# Patient Record
(demographics unavailable — no encounter records)

---

## 2024-10-10 NOTE — HISTORY OF PRESENT ILLNESS
[FreeTextEntry1] : 91yof with dementia and sleep disturbance  worse sleep disturbance when went to other dtr's house some mild increase episodes of agitation when increased tired improved sleep back at home going to day care

## 2024-10-10 NOTE — ASSESSMENT
[FreeTextEntry1] : Dementia with worse sleep disturbance: cw  trazodone 25mg qhs for sleep disturbance did not tolerate aricept- and no effect discussed trial of namenda, c/w assistance with IADL's and some ADL's

## 2024-11-13 NOTE — CONSULT LETTER
[Dear  ___] : Dear  [unfilled], [Consult Letter:] : I had the pleasure of evaluating your patient, [unfilled]. [Please see my note below.] : Please see my note below. [Consult Closing:] : Thank you very much for allowing me to participate in the care of this patient.  If you have any questions, please do not hesitate to contact me. [Sincerely,] : Sincerely, [FreeTextEntry3] : Dr. Eric Lopez

## 2024-11-13 NOTE — DISCUSSION/SUMMARY
[de-identified] : Discussed at length the nature of the patient's condition. Patient is minimally symptomatic to her bilateral knees which is secondary to degenerative arthritis with varus deformity. We will continue a course of nonoperative treatment. In the interim I suggested she continue with home exercise and senior center activities and weight management.  Tylenol if needed. She uses a cane for ambulation. They can continue activities as tolerated. This was explained in the presence of their daughter.   I will see them back in 6 months with x-rays of both knees.

## 2024-11-13 NOTE — ADDENDUM
[FreeTextEntry1] : This note was written by Keke De Leon on 11/13/2024 acting as scribe for Dr. Eric Lopez M.D.  I, Dr. Eric Lopez, have read and attest that all the information, medical decision making and discharge instructions within are true and accurate.

## 2024-11-13 NOTE — PHYSICAL EXAM
[de-identified] : General appearance: well-nourished and hydrated, pleasant, alert and oriented x 3, cooperative. HEENT: Normocephalic, EOM intact, Nasal septum midline, Oral cavity clear, External auditory canal clear. Cardiovascular: no apparent abnormalities, no lower leg edema, no varicosities, pedal pulses are palpable. Lymphatics Lymph nodes: none palpated, Lymphedema: not present. Neurologic: sensation is normal, no muscle weakness in upper or lower extremities, patella tendon reflexes intact. Dermatologic no apparent skin lesions, moist, warm, no rash. Spine: cervical spine appears normal and moves freely, thoracic spine appears normal and moves freely, lumbosacral spine appears normal and moves freely. Gait: nonantalgic.  Right Knee Inspection: trace effusion, no erythema.  Wounds: none. Alignment: 5 degrees varus alignment. Palpation: no specific tenderness on palpation. ROM: Active (in degrees):  0-100 with crepitus and minimal discomfort on arc of motion Ligamentous laxity (neg): all ligaments appear stable, negative ant. drawer test, negative post. drawer test, stable to varus stress test, stable to valgus stress test, negative Lachman's test, negative pivot shift test, Meniscal Test: negative McMurrays, negative Janet. Patellofemoral Alignment Test: Q angle-, normal. Muscle Test: good quad strength. Leg examination: calf is soft and non-tender.  Left Knee Inspection: trace effusion, no erythema.  Wounds: none. Alignment: 5 degrees varus alignment . Palpation: no specific tenderness on palpation. ROM: Active (in degrees): 0-100 with crepitus and minimal discomfort on arc of motion Ligamentous laxity (neg): all ligaments appear stable, negative ant. drawer test, negative post. drawer test, stable to varus stress test, stable to valgus stress test, negative Lachman's test, negative pivot shift test, Meniscal Test: negative McMurrays, negative Janet. Patellofemoral Alignment Test: Q angle-, normal. Muscle Test: good quad strength. Leg examination: calf is soft and non-tender. [de-identified] : Left knee x-rays, standing AP/Lateral and Merchant films, and 45-degree PA standing view, taken at the office today shows diffuse tricompartmental degenerative arthritis, medial joint space narrowing, marginal osteophytes, sclerosis, patellofemoral joint space narrowing, peripheral osteophytes, Kellgren and Teddy grade 4.  Right knee x-rays, standing AP/Lateral and Merchant films, and 45-degree PA standing view, taken at the office today shows diffuse tricompartmental degenerative arthritis, medial joint space narrowing, marginal osteophytes, sclerosis, patellofemoral joint space narrowing, peripheral osteophytes, Kellgren and Teddy grade 4.

## 2024-11-13 NOTE — HISTORY OF PRESENT ILLNESS
[de-identified] : KELLY MCCLELLAND, 91 year old female, presents for bilateral osteoarthritis. Patient has no symptoms today. She is doing well. She states her knees don't bother her at all. She uses a walker for ambulation. She does home exercises with her daughter. She denies pain medication use.

## 2025-01-17 NOTE — PHYSICAL EXAM
[Alert] : alert [Sclera] : the sclera and conjunctiva were normal [EOMI] : extraocular movements were intact [Normal Outer Ear/Nose] : the ears and nose were normal in appearance [Normal Appearance] : the appearance of the neck was normal [Supple] : the neck was supple [No Respiratory Distress] : no respiratory distress [No Acc Muscle Use] : no accessory muscle use [Respiration, Rhythm And Depth] : normal respiratory rhythm and effort [Auscultation Breath Sounds / Voice Sounds] : lungs were clear to auscultation bilaterally [Normal S1, S2] : normal S1 and S2 [Heart Rate And Rhythm] : heart rate was normal and rhythm regular [Edema] : edema was not present [Involuntary Movements] : no involuntary movements were seen [Normal Color / Pigmentation] : normal skin color and pigmentation [No Focal Deficits] : no focal deficits [Normal Affect] : the affect was normal [Normal Mood] : the mood was normal [de-identified] : epdermoid cyst on lower side of left breast, non inflammed

## 2025-01-17 NOTE — REVIEW OF SYSTEMS
[Fever] : no fever [Chills] : no chills [As Noted in HPI] : as noted in HPI [Negative] : Heme/Lymph [FreeTextEntry3] : macular degeneration

## 2025-01-17 NOTE — HISTORY OF PRESENT ILLNESS
[FreeTextEntry1] : 91yoF with HTN, afib, memory loss, gout seen for follow up with her dtr  Dementia: worse memory loss and has sleep disturbance did not tolerate Aricept-  eating okay no swallowing problems goes to day program twice weekly, going well needing for direction and assistance tolieting self No incontinence  functional status: walks with walker at home no recent falls  no swelling of legs no sob BM's are stable  htn: home readings have been controlled.  gout: no recurrent flares  afib: denies any bleeding episodes

## 2025-01-17 NOTE — ASSESSMENT
[FreeTextEntry1] : Dementia with sleep disturbance: cw  trazodone 50mg qhs for sleep disturbance did not tolerate aricept- and no effect cw namenda at 5mg bid (hold off on increase due to daytime tiredness) c/w assistance with IADL's and some ADL's c/w  program 2 days a week  ACP: MOLST on file: DNR/DNI  htn: stable c/w home log c/w current medications  hld; stable c/w crestor  unsteady gait: c/w walker use fall precautions  afib: stable c/w xaretlo.

## 2025-01-23 NOTE — HISTORY OF PRESENT ILLNESS
[FreeTextEntry1] : 91 year old woman s/p AVR, chronic AF.  She has been free of chest discomfort or dyspnea. or syncope.  BP elevated.  Patient has dementia.

## 2025-01-23 NOTE — DISCUSSION/SUMMARY
[FreeTextEntry1] : S/P AVR.  Stable terms of symptoms and exam.  AF continue metoprolol and Xarelto. HTN increase valsartan to 160.  Follow up echo scheduled. [EKG obtained to assist in diagnosis and management of assessed problem(s)] : EKG obtained to assist in diagnosis and management of assessed problem(s)

## 2025-01-23 NOTE — PHYSICAL EXAM

## 2025-02-14 NOTE — HISTORY OF PRESENT ILLNESS
[FreeTextEntry1] : rpa spots [de-identified] : Pt is a 91 year old F presenting for f/u with daughter Yulia  # Hard white spot on the right hand, present for a month. Picked at it this morning. Not painful nor bleeding.   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

## 2025-02-14 NOTE — PHYSICAL EXAM
[Alert] : alert [Well Nourished] : well nourished [Conjunctiva Non-injected] : conjunctiva non-injected [No Visual Lymphadenopathy] : no visual  lymphadenopathy [No Clubbing] : no clubbing [No Edema] : no edema [No Bromhidrosis] : no bromhidrosis [No Chromhidrosis] : no chromhidrosis [FreeTextEntry3] : white to pink scaly papule on the right dorsal hand  gritty erythematous papules on dorsal hands

## 2025-02-14 NOTE — HISTORY OF PRESENT ILLNESS
[FreeTextEntry1] : rpa spots [de-identified] : Pt is a 91 year old F presenting for f/u with daughter Yulia  # Hard white spot on the right hand, present for a month. Picked at it this morning. Not painful nor bleeding.   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

## 2025-02-14 NOTE — ASSESSMENT
[FreeTextEntry1] : #Actinic keratosis, dorsal hands  - Treated 6 lesions w/ LN2 X 2 cycles - The risks/benefits/alternatives of cryo-destruction was explained to the patient which include but are not limited to redness, pain, blistering, scar, discoloration of skin, and recurrence. The patient expressed understanding of these risks and agreed to the procedure. The procedure was well tolerated, without complication. We have discussed related skin care.  - in one week, Efudex 5% cream over dorsal hands BID for 3-6 weeks. SED including burning and inflammation. Medication ordered this visit.  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 SCCis vs. evolving SCC - clinical photo taken today for monitoring The risks/benefits/alternatives of cryo-destruction was explained to the patient which, include but are not limited to redness, swelling, pain, blistering, scar, discoloration of skin, and recurrence. The patient expressed understanding of these risks and agreed to the procedure. # 1 lesions treated with 2 cycles of LN2. The procedure was well tolerated, without complication. We have discussed related skin care. - Efudex trial as above - biopsy next visit if still present   RTC 3 months

## 2025-02-14 NOTE — HISTORY OF PRESENT ILLNESS
[FreeTextEntry1] : rpa spots [de-identified] : Pt is a 91 year old F presenting for f/u with daughter Yulia  # Hard white spot on the right hand, present for a month. Picked at it this morning. Not painful nor bleeding.   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

## 2025-02-27 NOTE — HISTORY OF PRESENT ILLNESS
[FreeTextEntry1] : 91yoF with pmhx of atrial fibrillation on Xarelto, HTN, HLD, s/p AVR, PPM, CAD, gout, unsteady gait walks with walker, Dementia who seen for acute visit with her daughters for elevated BP.  history of provided by irma due to dementia:  elevated BP found yesterday during echocardiogram, and again today in 180- 190 sbp adherent with medications. - dtr provides also report new eye movement abnormalities. patient has dementia, and has confusion at baseline.   Her gait is slow at baseline, she was slightly more imbalanced this morning, but dtr reports walked at her baseline into office today denies severe headache, fall/trauma.   MOLST form: DNR/DNI

## 2025-02-27 NOTE — REVIEW OF SYSTEMS
[Fever] : no fever [Chills] : no chills [Eyesight Problems] : eyesight problems [Incontinence] : incontinence [As Noted in HPI] : as noted in HPI [Negative] : Endocrine

## 2025-02-27 NOTE — PHYSICAL EXAM
[Alert] : alert [Sclera] : the sclera and conjunctiva were normal [Normal Outer Ear/Nose] : the ears and nose were normal in appearance [Normal Appearance] : the appearance of the neck was normal [No Respiratory Distress] : no respiratory distress [No Acc Muscle Use] : no accessory muscle use [Respiration, Rhythm And Depth] : normal respiratory rhythm and effort [Involuntary Movements] : no involuntary movements were seen [Normal Affect] : the affect was normal [Normal Mood] : the mood was normal [FreeTextEntry1] : abnormal EOMI, CN 6 palsy [de-identified] : no other gross motor deficits, gait unsteady (at baseline)

## 2025-02-27 NOTE — ASSESSMENT
[FreeTextEntry1] : 91yoF with pmhx of atrial fibrillation on Xarelto, HTN, HLD, s/p AVR, PPM, CAD, gout, unsteady gait walks with walker, Dementia.  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

## 2025-03-03 NOTE — PHYSICAL EXAM
[Alert] : alert [No Acute Distress] : in no acute distress [Sclera] : the sclera and conjunctiva were normal [Normal Outer Ear/Nose] : the ears and nose were normal in appearance [Normal Appearance] : the appearance of the neck was normal [No Respiratory Distress] : no respiratory distress [No Acc Muscle Use] : no accessory muscle use [Auscultation Breath Sounds / Voice Sounds] : lungs were clear to auscultation bilaterally [Normal S1, S2] : normal S1 and S2 [Heart Rate And Rhythm] : heart rate was normal and rhythm regular [Edema] : edema was not present [Normal Affect] : the affect was normal [Normal Mood] : the mood was normal [FreeTextEntry1] : abnormal EOMI [de-identified] : right sided facial weakness mild, diplopia and dysmetria, normal heel to shin b/l

## 2025-03-03 NOTE — ASSESSMENT
[FreeTextEntry1] : thyroid nodule: previous US from 2021 approx 3.0 cm right nodule CT neck measuring 2.5cm plan to repeat US of thyroid  CVA: hospital records reviewed CTH and CT neck reviewed diplopia and mild right sided facial droop pending f/u with neurologist next week also needs f/u with ophthalmologist c/w statin and xarelto  afib: stable c/w xarelto  htn: flucuating advised to start home BP check daily  c/w metoprolol 100mg and valsaran 160mg daily  unsteady gait: discussed risk of falls walker use fall prevention  DNR/DNI

## 2025-04-28 NOTE — PHYSICAL EXAM
[Alert] : alert [No Acute Distress] : in no acute distress [Sclera] : the sclera and conjunctiva were normal [EOMI] : extraocular movements were intact [Normal Outer Ear/Nose] : the ears and nose were normal in appearance [Normal Appearance] : the appearance of the neck was normal [Normal] : heart rate was normal and rhythm regular, normal S1 and S2 [Edema] : edema was not present [Abdomen Tenderness] : non-tender [Normal Affect] : the affect was normal [Normal Mood] : the mood was normal [de-identified] : slow unsteady gait, walker use [de-identified] : sebacous cyst [de-identified] : shuffling gait, bradykinesia

## 2025-04-28 NOTE — HISTORY OF PRESENT ILLNESS
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 using walker parkinsonism features  htn: has been elevated so now on higher dose of valsartan, but still uncontrolled  sleeping very well-  open to dose reduction of trazadone  no swallowing issues eating well sleeping well no urinary or bowel complaints

## 2025-04-28 NOTE — ASSESSMENT
[FreeTextEntry1] : HTN: uncontrolled increase valsartan to 320mg daily c/w metoprolol at current dose  sleep disturbance: reduce trazadone from 50mg to 25mg qhs  dementia: worse anxiety- separation, repetitive c/w namenda 5mg daily-  consider dose elevation vs addition anxiety medication once adjust trazadone  afib: stable c/w xarelto   unsteady gait: discussed risk of falls walker use fall prevention

## 2025-05-25 NOTE — HISTORY OF PRESENT ILLNESS
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

## 2025-05-25 NOTE — PHYSICAL EXAM
[Alert] : alert [Well Nourished] : well nourished [Conjunctiva Non-injected] : conjunctiva non-injected [No Visual Lymphadenopathy] : no visual  lymphadenopathy [No Clubbing] : no clubbing [No Edema] : no edema [No Bromhidrosis] : no bromhidrosis [No Chromhidrosis] : no chromhidrosis [FreeTextEntry3] : surgical scar on r dorsal hand

## 2025-05-25 NOTE — ASSESSMENT
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

## 2025-06-02 NOTE — REVIEW OF SYSTEMS
[Feeling Tired] : feeling tired [Confused] : confusion [Fever] : no fever [Chills] : no chills [Heart Rate Is Slow] : the heart rate was not slow [Heart Rate Is Fast] : the heart rate was not fast [Chest Pain] : no chest pain [Palpitations] : no palpitations [Shortness Of Breath] : no shortness of breath [Wheezing] : no wheezing [Cough] : no cough [Abdominal Pain] : no abdominal pain [Vomiting] : no vomiting [Constipation] : no constipation [Diarrhea] : no diarrhea [Dysuria] : no dysuria [Dizziness] : no dizziness [FreeTextEntry2] : limited due to underlying dementia

## 2025-06-02 NOTE — PHYSICAL EXAM
[Alert] : alert [No Acute Distress] : in no acute distress [EOMI] : extraocular movements were intact [Normal Oral Mucosa] : normal oral mucosa [Normal Appearance] : the appearance of the neck was normal [Supple] : the neck was supple [No Respiratory Distress] : no respiratory distress [No Acc Muscle Use] : no accessory muscle use [Auscultation Breath Sounds / Voice Sounds] : lungs were clear to auscultation bilaterally [Normal S1, S2] : normal S1 and S2 [Heart Rate And Rhythm] : heart rate was normal and rhythm regular [Abdomen Tenderness] : non-tender [Abdomen Soft] : soft [Normal Color / Pigmentation] : normal skin color and pigmentation [No Focal Deficits] : no focal deficits [Normal Insight/Judgment] : insight and judgment were not intact

## 2025-06-02 NOTE — HISTORY OF PRESENT ILLNESS
[Medical reason not done] : Medical reason not done [I have developed a follow-up plan documented below in the note.] : I have developed a follow-up plan documented below in the note. [FreeTextEntry1] : 93 yo female w/ a hx of a fib on Xarelto, htn, hld, s/p AVR, PPM, CAD, gout and dementia presents to the office for an acute visit. Pt is accompanied by her daughter who helps provide history. Pt states she has no acute complaints. Daughter reports patient was confused over the last week or so. She reported throat pain a few days ago. Currently denies any throat discomfort. Denies fever, chills, congestion, cough, n/v/d. Patient lives with another daughter who was not present at the visit today who reports a foul odor from her urine. Pt also wants to go to bed earlier in the day and seems more tired than usual.  [de-identified] : dementia

## 2025-06-18 NOTE — ADDENDUM
[FreeTextEntry1] : This note was written by Velia Mejia on 06/18/2025 acting as scribe for Dr. Eric Lopez M.D.   I, Dr. Eric Lopez, have read and attest that all the information, medical decision making and discharge instructions within are true and accurate.

## 2025-06-18 NOTE — DISCUSSION/SUMMARY
[de-identified] : Discussed at length the nature of the patient's condition. The patient presents with bilateral knee degenerative arthritis and is minimally symptomatic. Therefore, we will continue nonoperatively. There is no need for injection therapy. In the interim, I suggested home exercise, weight management, and Tylenol as needed. They can continue activities as tolerated. This was explained in the presence of their daughter.   I will see them back in 6 months.

## 2025-06-18 NOTE — HISTORY OF PRESENT ILLNESS
[de-identified] : KELLY MCCLELLAND  92 year old female presents for evaluation of B/L knee OA. She is trying to stay active and uses a walker for ambulation. She denies taking any pain medication. She is here for a follow up and doesn't think she needs any treatment as she isn't symptomatic at this time.

## 2025-06-18 NOTE — PHYSICAL EXAM
[de-identified] : General appearance: well-nourished and hydrated, pleasant, alert and oriented x 3, cooperative. HEENT: Normocephalic, EOM intact, Nasal septum midline, Oral cavity clear, External auditory canal clear. Cardiovascular: no apparent abnormalities, no lower leg edema, no varicosities, pedal pulses are palpable. Lymphatics Lymph nodes: none palpated, Lymphedema: not present. Neurologic: sensation is normal, no muscle weakness in upper or lower extremities, patella tendon reflexes intact. Dermatologic no apparent skin lesions, moist, warm, no rash. Spine: cervical spine appears normal and moves freely, thoracic spine appears normal and moves freely, lumbosacral spine appears normal and moves freely. Gait: nonantalgic.  Right Knee Inspection: trace effusion, no erythema.  Wounds: none. Alignment: 5 degrees varus alignment. Palpation: no specific tenderness on palpation. ROM: Active (in degrees):  0-100 with crepitus and minimal discomfort on arc of motion Ligamentous laxity (neg): all ligaments appear stable, negative ant. drawer test, negative post. drawer test, stable to varus stress test, stable to valgus stress test, negative Lachman's test, negative pivot shift test, Meniscal Test: negative McMurrays, negative Janet. Patellofemoral Alignment Test: Q angle-, normal. Muscle Test: good quad strength. Leg examination: calf is soft and non-tender.  Left Knee Inspection: trace effusion, no erythema.  Wounds: none. Alignment: 5 degrees varus alignment . Palpation: no specific tenderness on palpation. ROM: Active (in degrees): 0-100 with crepitus and minimal discomfort on arc of motion Ligamentous laxity (neg): all ligaments appear stable, negative ant. drawer test, negative post. drawer test, stable to varus stress test, stable to valgus stress test, negative Lachman's test, negative pivot shift test, Meniscal Test: negative McMurrays, negative Janet. Patellofemoral Alignment Test: Q angle-, normal. Muscle Test: good quad strength. Leg examination: calf is soft and non-tender. [de-identified] : Left knee x-rays, standing AP/Lateral and Merchant films, and 45-degree PA standing view, taken at the office today shows diffuse tricompartmental degenerative arthritis, medial joint space narrowing, marginal osteophytes, sclerosis, patellofemoral joint space narrowing, peripheral osteophytes, Kellgren and Teddy grade 4.  Right knee x-rays, standing AP/Lateral and Merchant films, and 45-degree PA standing view, taken at the office today shows diffuse tricompartmental degenerative arthritis, medial joint space narrowing, marginal osteophytes, sclerosis, patellofemoral joint space narrowing, peripheral osteophytes, Kellgren and Teddy grade 4.

## 2025-07-24 NOTE — HISTORY OF PRESENT ILLNESS
[FreeTextEntry1] : 91 year old woman s/p AVR, chronic AF.  She has been free of chest discomfort or dyspnea. or syncope.  BP adequately controlled on current meds.  Echo shows satisfactory valve function.

## 2025-07-24 NOTE — PHYSICAL EXAM

## 2025-07-24 NOTE — DISCUSSION/SUMMARY
[FreeTextEntry1] : S/P AVR.  Stable terms of symptoms and exam.  AF continue metoprolol and Xarelto. HTN continue valsartan 320 mg and .metoprolol 100 mg. . [EKG obtained to assist in diagnosis and management of assessed problem(s)] : EKG obtained to assist in diagnosis and management of assessed problem(s) .